# Patient Record
Sex: MALE | Race: WHITE | NOT HISPANIC OR LATINO | Employment: OTHER | URBAN - METROPOLITAN AREA
[De-identification: names, ages, dates, MRNs, and addresses within clinical notes are randomized per-mention and may not be internally consistent; named-entity substitution may affect disease eponyms.]

---

## 2017-11-08 ENCOUNTER — APPOINTMENT (OUTPATIENT)
Dept: CT IMAGING | Facility: HOSPITAL | Age: 72
End: 2017-11-08

## 2017-11-08 ENCOUNTER — HOSPITAL ENCOUNTER (EMERGENCY)
Facility: HOSPITAL | Age: 72
Discharge: HOME | End: 2017-11-08
Attending: FAMILY MEDICINE | Admitting: FAMILY MEDICINE

## 2017-11-08 VITALS
HEART RATE: 80 BPM | SYSTOLIC BLOOD PRESSURE: 122 MMHG | WEIGHT: 207 LBS | TEMPERATURE: 98.3 F | HEIGHT: 70 IN | DIASTOLIC BLOOD PRESSURE: 80 MMHG | RESPIRATION RATE: 16 BRPM | BODY MASS INDEX: 29.63 KG/M2 | OXYGEN SATURATION: 97 %

## 2017-11-08 DIAGNOSIS — N39.0 ACUTE UTI: ICD-10-CM

## 2017-11-08 DIAGNOSIS — R56.9 SEIZURE (HCC): Primary | ICD-10-CM

## 2017-11-08 LAB
ALBUMIN SERPL-MCNC: 4.4 G/DL (ref 3.5–5.2)
ALBUMIN/GLOB SERPL: 1.1 G/DL
ALP SERPL-CCNC: 71 U/L (ref 39–117)
ALT SERPL W P-5'-P-CCNC: 8 U/L (ref 1–41)
ANION GAP SERPL CALCULATED.3IONS-SCNC: 15 MMOL/L
AST SERPL-CCNC: 13 U/L (ref 1–40)
BACTERIA UR QL AUTO: ABNORMAL /HPF
BASOPHILS # BLD AUTO: 0.02 10*3/MM3 (ref 0–0.2)
BASOPHILS NFR BLD AUTO: 0.2 % (ref 0–1.5)
BILIRUB SERPL-MCNC: 0.5 MG/DL (ref 0.1–1.2)
BILIRUB UR QL STRIP: NEGATIVE
BUN BLD-MCNC: 15 MG/DL (ref 8–23)
BUN/CREAT SERPL: 21.4 (ref 7–25)
CALCIUM SPEC-SCNC: 9.9 MG/DL (ref 8.6–10.5)
CHLORIDE SERPL-SCNC: 92 MMOL/L (ref 98–107)
CK SERPL-CCNC: 19 U/L (ref 20–200)
CLARITY UR: ABNORMAL
CO2 SERPL-SCNC: 25 MMOL/L (ref 22–29)
COLOR UR: YELLOW
CREAT BLD-MCNC: 0.7 MG/DL (ref 0.76–1.27)
DEPRECATED RDW RBC AUTO: 45.5 FL (ref 37–54)
EOSINOPHIL # BLD AUTO: 0.01 10*3/MM3 (ref 0–0.7)
EOSINOPHIL NFR BLD AUTO: 0.1 % (ref 0.3–6.2)
ERYTHROCYTE [DISTWIDTH] IN BLOOD BY AUTOMATED COUNT: 13.1 % (ref 11.5–14.5)
GFR SERPL CREATININE-BSD FRML MDRD: 110 ML/MIN/1.73
GLOBULIN UR ELPH-MCNC: 3.9 GM/DL
GLUCOSE BLD-MCNC: 104 MG/DL (ref 65–99)
GLUCOSE UR STRIP-MCNC: NEGATIVE MG/DL
HCT VFR BLD AUTO: 39.6 % (ref 40.4–52.2)
HGB BLD-MCNC: 14.2 G/DL (ref 13.7–17.6)
HGB UR QL STRIP.AUTO: NEGATIVE
HYALINE CASTS UR QL AUTO: ABNORMAL /LPF
IMM GRANULOCYTES # BLD: 0.06 10*3/MM3 (ref 0–0.03)
IMM GRANULOCYTES NFR BLD: 0.5 % (ref 0–0.5)
INR PPP: 1.07 (ref 0.9–1.1)
KETONES UR QL STRIP: NEGATIVE
LEUKOCYTE ESTERASE UR QL STRIP.AUTO: ABNORMAL
LYMPHOCYTES # BLD AUTO: 0.79 10*3/MM3 (ref 0.9–4.8)
LYMPHOCYTES NFR BLD AUTO: 6.6 % (ref 19.6–45.3)
MCH RBC QN AUTO: 34.1 PG (ref 27–32.7)
MCHC RBC AUTO-ENTMCNC: 35.9 G/DL (ref 32.6–36.4)
MCV RBC AUTO: 95.2 FL (ref 79.8–96.2)
MONOCYTES # BLD AUTO: 0.79 10*3/MM3 (ref 0.2–1.2)
MONOCYTES NFR BLD AUTO: 6.6 % (ref 5–12)
NEUTROPHILS # BLD AUTO: 10.35 10*3/MM3 (ref 1.9–8.1)
NEUTROPHILS NFR BLD AUTO: 86 % (ref 42.7–76)
NITRITE UR QL STRIP: POSITIVE
PH UR STRIP.AUTO: 5.5 [PH] (ref 5–8)
PLATELET # BLD AUTO: 257 10*3/MM3 (ref 140–500)
PMV BLD AUTO: 10.3 FL (ref 6–12)
POTASSIUM BLD-SCNC: 4.2 MMOL/L (ref 3.5–5.2)
PROT SERPL-MCNC: 8.3 G/DL (ref 6–8.5)
PROT UR QL STRIP: NEGATIVE
PROTHROMBIN TIME: 13.5 SECONDS (ref 11.7–14.2)
RBC # BLD AUTO: 4.16 10*6/MM3 (ref 4.6–6)
RBC # UR: ABNORMAL /HPF
REF LAB TEST METHOD: ABNORMAL
SODIUM BLD-SCNC: 132 MMOL/L (ref 136–145)
SP GR UR STRIP: 1.01 (ref 1–1.03)
SQUAMOUS #/AREA URNS HPF: ABNORMAL /HPF
TROPONIN T SERPL-MCNC: <0.01 NG/ML (ref 0–0.03)
UROBILINOGEN UR QL STRIP: ABNORMAL
WBC NRBC COR # BLD: 12.02 10*3/MM3 (ref 4.5–10.7)
WBC UR QL AUTO: ABNORMAL /HPF

## 2017-11-08 PROCEDURE — 87186 SC STD MICRODIL/AGAR DIL: CPT | Performed by: FAMILY MEDICINE

## 2017-11-08 PROCEDURE — 87086 URINE CULTURE/COLONY COUNT: CPT | Performed by: FAMILY MEDICINE

## 2017-11-08 PROCEDURE — 81001 URINALYSIS AUTO W/SCOPE: CPT | Performed by: FAMILY MEDICINE

## 2017-11-08 PROCEDURE — 96367 TX/PROPH/DG ADDL SEQ IV INF: CPT

## 2017-11-08 PROCEDURE — 85610 PROTHROMBIN TIME: CPT | Performed by: FAMILY MEDICINE

## 2017-11-08 PROCEDURE — 25010000002 LEVOFLOXACIN PER 250 MG: Performed by: FAMILY MEDICINE

## 2017-11-08 PROCEDURE — 85025 COMPLETE CBC W/AUTO DIFF WBC: CPT | Performed by: FAMILY MEDICINE

## 2017-11-08 PROCEDURE — 25010000002 LEVETRIRACETAM PER 10 MG: Performed by: FAMILY MEDICINE

## 2017-11-08 PROCEDURE — 84484 ASSAY OF TROPONIN QUANT: CPT | Performed by: FAMILY MEDICINE

## 2017-11-08 PROCEDURE — 99284 EMERGENCY DEPT VISIT MOD MDM: CPT

## 2017-11-08 PROCEDURE — 80053 COMPREHEN METABOLIC PANEL: CPT | Performed by: FAMILY MEDICINE

## 2017-11-08 PROCEDURE — 93005 ELECTROCARDIOGRAM TRACING: CPT | Performed by: FAMILY MEDICINE

## 2017-11-08 PROCEDURE — 82550 ASSAY OF CK (CPK): CPT | Performed by: FAMILY MEDICINE

## 2017-11-08 PROCEDURE — 96365 THER/PROPH/DIAG IV INF INIT: CPT

## 2017-11-08 PROCEDURE — 70450 CT HEAD/BRAIN W/O DYE: CPT

## 2017-11-08 RX ORDER — CLOPIDOGREL BISULFATE 75 MG/1
75 TABLET ORAL DAILY
COMMUNITY

## 2017-11-08 RX ORDER — LEVETIRACETAM 500 MG/1
250 TABLET ORAL 2 TIMES DAILY
Qty: 60 TABLET | Refills: 0 | Status: SHIPPED | OUTPATIENT
Start: 2017-11-08

## 2017-11-08 RX ORDER — FEXOFENADINE HYDROCHLORIDE 60 MG/1
60 TABLET, FILM COATED ORAL DAILY
COMMUNITY

## 2017-11-08 RX ORDER — LEVOFLOXACIN 5 MG/ML
500 INJECTION, SOLUTION INTRAVENOUS ONCE
Status: COMPLETED | OUTPATIENT
Start: 2017-11-08 | End: 2017-11-08

## 2017-11-08 RX ORDER — SULFAMETHOXAZOLE AND TRIMETHOPRIM 800; 160 MG/1; MG/1
1 TABLET ORAL 2 TIMES DAILY
Qty: 14 TABLET | Refills: 0 | Status: SHIPPED | OUTPATIENT
Start: 2017-11-08 | End: 2017-11-08 | Stop reason: HOSPADM

## 2017-11-08 RX ORDER — SULFAMETHOXAZOLE AND TRIMETHOPRIM 800; 160 MG/1; MG/1
1 TABLET ORAL 2 TIMES DAILY
Qty: 14 TABLET | Refills: 0 | Status: SHIPPED | OUTPATIENT
Start: 2017-11-08

## 2017-11-08 RX ORDER — ATORVASTATIN CALCIUM 40 MG/1
40 TABLET, FILM COATED ORAL DAILY
COMMUNITY

## 2017-11-08 RX ADMIN — LEVETIRACETAM 250 MG: 100 INJECTION, SOLUTION, CONCENTRATE INTRAVENOUS at 18:14

## 2017-11-08 RX ADMIN — LEVOFLOXACIN 500 MG: 5 INJECTION, SOLUTION INTRAVENOUS at 18:55

## 2017-11-08 NOTE — ED PROVIDER NOTES
" EMERGENCY DEPARTMENT ENCOUNTER    CHIEF COMPLAINT  Chief Complaint: syncope  History given by: patient  History limited by: dementia  Room Number: 12/12  PMD: Hugo Galeas MD      HPI:  Pt is a 72 y.o. male who presents to the ED via EMS. Pt said he walking outside but then became hot and vomited. When he walked back inside the house, he remembers falling against the wall and sliding down on his butt. He does not remember the fall. Pt has a hx of dementia, TIA, HTN, UTI, and hyperlipidemia.     Duration:  unknown  Onset: sudden  Timing: unknown  Location: generalized  Radiation: none  Quality: n/a  Intensity/Severity: severe  Progression: resolved  Associated Symptoms: \"hot\" and vomiting  Aggravating Factors: none  Alleviating Factors: none  Previous Episodes: none  Treatment before arrival: EMS care and intervention.     PAST MEDICAL HISTORY  Active Ambulatory Problems     Diagnosis Date Noted   • No Active Ambulatory Problems     Resolved Ambulatory Problems     Diagnosis Date Noted   • No Resolved Ambulatory Problems     Past Medical History:   Diagnosis Date   • Dementia    • Hyperlipidemia    • Hypertension    • TIA (transient ischemic attack)    • UTI (urinary tract infection)        PAST SURGICAL HISTORY  Past Surgical History:   Procedure Laterality Date   • CAROTID ARTERY ANGIOPLASTY     • REPLACEMENT TOTAL KNEE BILATERAL     • VASECTOMY         FAMILY HISTORY  History reviewed. No pertinent family history.    SOCIAL HISTORY  Social History     Social History   • Marital status:      Spouse name: N/A   • Number of children: N/A   • Years of education: N/A     Occupational History   • Not on file.     Social History Main Topics   • Smoking status: Former Smoker     Types: Cigarettes     Quit date: 1997   • Smokeless tobacco: Not on file   • Alcohol use Yes      Comment: 3-4 drinks of rum/night   • Drug use: No   • Sexual activity: Not on file     Other Topics Concern   • Not on file     Social " History Narrative   • No narrative on file       ALLERGIES  Penicillins    REVIEW OF SYSTEMS  Review of Systems   Unable to perform ROS: Dementia       PHYSICAL EXAM  ED Triage Vitals   Temp Heart Rate Resp BP SpO2   11/08/17 1530 11/08/17 1530 11/08/17 1530 11/08/17 1530 11/08/17 1530   98.3 °F (36.8 °C) 78 20 128/80 96 %      Temp src Heart Rate Source Patient Position BP Location FiO2 (%)   -- 11/08/17 1545 11/08/17 1530 11/08/17 1545 --    Monitor Sitting Right arm        Physical Exam   Constitutional: He is oriented to person, place, and time and well-developed, well-nourished, and in no distress.   HENT:   Head: Normocephalic and atraumatic.   Eyes: EOM are normal. Pupils are equal, round, and reactive to light.   Neck: Normal range of motion. Neck supple.   Cardiovascular: Normal rate, regular rhythm and normal heart sounds.    Pulmonary/Chest: Effort normal and breath sounds normal. No respiratory distress.   Abdominal: Soft. There is no tenderness. There is no rebound and no guarding.   Musculoskeletal: Normal range of motion. He exhibits no edema.   Neurological: He is alert and oriented to person, place, and time. He has normal sensation and normal strength.   Skin: Skin is warm and dry.   Psychiatric: Mood and affect normal.   Nursing note and vitals reviewed.      LAB RESULTS  Lab Results (last 24 hours)     Procedure Component Value Units Date/Time    CBC & Differential [[id number]] Collected:  11/08/17 1623    Specimen:  Blood Updated:  11/08/17 1703    Narrative:       The following orders were created for panel order CBC & Differential.  Procedure                               Abnormality         Status                     ---------                               -----------         ------                     CBC Auto Differential[[id number]]        Abnormal            Final result                 Please view results for these tests on the individual orders.    Comprehensive Metabolic Panel  [[id number]]  (Abnormal) Collected:  11/08/17 1623    Specimen:  Blood Updated:  11/08/17 1726     Glucose 104 (H) mg/dL      BUN 15 mg/dL      Creatinine 0.70 (L) mg/dL      Sodium 132 (L) mmol/L      Potassium 4.2 mmol/L      Chloride 92 (L) mmol/L      CO2 25.0 mmol/L      Calcium 9.9 mg/dL      Total Protein 8.3 g/dL      Albumin 4.40 g/dL      ALT (SGPT) 8 U/L      AST (SGOT) 13 U/L      Alkaline Phosphatase 71 U/L      Total Bilirubin 0.5 mg/dL      eGFR Non African Amer 110 mL/min/1.73      Globulin 3.9 gm/dL      A/G Ratio 1.1 g/dL      BUN/Creatinine Ratio 21.4     Anion Gap 15.0 mmol/L     Narrative:       The MDRD GFR formula is only valid for adults with stable renal function between ages 18 and 70.    CK [[id number]]  (Abnormal) Collected:  11/08/17 1623    Specimen:  Blood Updated:  11/08/17 1726     Creatine Kinase 19 (L) U/L     Troponin [[id number]]  (Normal) Collected:  11/08/17 1623    Specimen:  Blood Updated:  11/08/17 1726     Troponin T <0.010 ng/mL     Narrative:       Troponin T Reference Ranges:  Less than 0.03 ng/mL:    Negative for AMI  0.03 to 0.09 ng/mL:      Indeterminant for AMI  Greater than 0.09 ng/mL: Positive for AMI    Protime-INR [[id number]]  (Normal) Collected:  11/08/17 1623    Specimen:  Blood Updated:  11/08/17 1715     Protime 13.5 Seconds      INR 1.07    CBC Auto Differential [[id number]]  (Abnormal) Collected:  11/08/17 1623    Specimen:  Blood Updated:  11/08/17 1703     WBC 12.02 (H) 10*3/mm3      RBC 4.16 (L) 10*6/mm3      Hemoglobin 14.2 g/dL      Hematocrit 39.6 (L) %      MCV 95.2 fL      MCH 34.1 (H) pg      MCHC 35.9 g/dL      RDW 13.1 %      RDW-SD 45.5 fl      MPV 10.3 fL      Platelets 257 10*3/mm3      Neutrophil % 86.0 (H) %      Lymphocyte % 6.6 (L) %      Monocyte % 6.6 %      Eosinophil % 0.1 (L) %      Basophil % 0.2 %      Immature Grans % 0.5 %      Neutrophils, Absolute 10.35 (H) 10*3/mm3      Lymphocytes, Absolute 0.79 (L) 10*3/mm3      Monocytes,  Absolute 0.79 10*3/mm3      Eosinophils, Absolute 0.01 10*3/mm3      Basophils, Absolute 0.02 10*3/mm3      Immature Grans, Absolute 0.06 (H) 10*3/mm3     Urinalysis With / Culture If Indicated - Urine, Clean Catch [[id number]]  (Abnormal) Collected:  11/08/17 1740    Specimen:  Urine from Urine, Catheter Updated:  11/08/17 1752     Color, UA Yellow     Appearance, UA Cloudy (A)     pH, UA 5.5     Specific Gravity, UA 1.012     Glucose, UA Negative     Ketones, UA Negative     Bilirubin, UA Negative     Blood, UA Negative     Protein, UA Negative     Leuk Esterase, UA Large (3+) (A)     Nitrite, UA Positive (A)     Urobilinogen, UA 0.2 E.U./dL    Urinalysis, Microscopic Only - Urine, Clean Catch [[id number]]  (Abnormal) Collected:  11/08/17 1740    Specimen:  Urine from Urine, Catheter Updated:  11/08/17 1804     RBC, UA 0-2 /HPF      WBC, UA Too Numerous to Count (A) /HPF      Bacteria, UA 4+ (A) /HPF      Squamous Epithelial Cells, UA 0-2 /HPF      Hyaline Casts, UA 0-2 /LPF      Methodology Automated Microscopy    Urine Culture - Urine, Urine, Clean Catch [[id number]] Collected:  11/08/17 1740    Specimen:  Urine from Urine, Catheter Updated:  11/08/17 1751          I ordered the above labs and reviewed the results    RADIOLOGY  CT Head Without Contrast   Final Result           No acute intracranial hemorrhage or hydrocephalus. Likely chronic   ischemic changes of the right frontoparietal cortex, suggest clinical   correlation, and comparison with prior imaging if it can be obtained;   enhanced imaging follow-up can be obtained to exclude any possibility of   a lesion.       Discussed by telephone Dr. Cummins at time of interpretation, 1700,   11/08/2017.       This report was finalized on 11/8/2017 5:01 PM by Dr. Brian Manzanares MD.               I ordered the above noted radiological studies. Interpreted by radiologist. Reviewed by me in PACS.       PROCEDURES  Procedures  EKG           EKG time:  1612  Rhythm/Rate: NSR 77  P waves and VT: nml  QRS, axis: nml   ST and T waves: nml     Interpreted Contemporaneously by me, independently viewed  No prior for comparison.       PROGRESS AND CONSULTS  ED Course   1601  EKG, CBC, CK, UA, CMP, protime-INR, Troponin, CT head ordered.    1712  BP- 152/85 HR- 75 Temp- 98.3 °F (36.8 °C) O2 sat- 98%  Rechecked the patient who is in NAD and is resting comfortably. Daughter-in-law is now bedside. She reports that he had a similar episode a year ago and was taken to the ED. Pt had another similar episode labor day weekend where the pt fell and had incontinence followed with another episode two weeks after that. She doubts that he has full LOC but can have nonresponiveness lasting approximately 15 minutes. Pt's wife reported to her that today he was actually shaking and vomited prior to falling and was again incontinent of urine. Pt has moved from [place] in September and sees Dr. Galeas now and Dr. Caldwell for neurology. Pt is back to baseline per pt's family.     1722  Call placed to PCP and neurology    1732  Dr. Galeas consulted and reviewed the records. He had not seen an MRI yet on the pt.     1737  Dr. Caldwell consulted and told to prescribe sz medications (low dose keppra) and to continue with the scheduled EEG and follow up.     1740  Discussed the plan to give first dose of sz medication and give rx upon d/c. Pt's family told that follow up MRI would need to be done. Pt's family understands and agrees with the plan, all questions answered.    1828  Beside rectal exam showed normal prostate. Pt was also cathed and 500mLs removed. Pt says he says he caths himself occasionally and has been doing that for the past few years but has not recently.  He is not sure why but apparently has some retention.  Pt will be put on an abx and referred to an urologist.     MEDICAL DECISION MAKING  Results were reviewed/discussed with the patient and they were also made aware of online access. Pt  also made aware that some labs, such as cultures, will not be resulted during ER visit and follow up with PMD is necessary.     MDM  Number of Diagnoses or Management Options     Amount and/or Complexity of Data Reviewed  Clinical lab tests: (CK 19, Troponin is <0.010)  Tests in the radiology section of CPT®: reviewed (Two right cortical areas that are probably old infarcts but need to be followed up with enhanced study.)  Tests in the medicine section of CPT®: reviewed (See EKG procedure note.)  Discussion of test results with the performing providers: yes (Dr. Manzanares, radiology)  Discuss the patient with other providers: yes (Dr. Galeas, PCP  Dr. Caldwell, neurology)           DIAGNOSIS  Final diagnoses:   Seizure   Acute UTI       DISPOSITION  DISCHARGE    Patient discharged in stable condition.    Reviewed implications of results, diagnosis, meds, responsibility to follow up, warning signs and symptoms of possible worsening, potential complications and reasons to return to ER.    Patient/Family voiced understanding of above instructions.    Discussed plan for discharge, as there is no emergent indication for admission.  Pt/family is agreeable and understands need for follow up and repeat testing.  Pt is aware that discharge does not mean that nothing is wrong but it indicates no emergency is present that requires admission and they must continue care with follow-up as given below or physician of their choice.     FOLLOW-UP  Hugo Galeas MD  [address] [facility] [address] [phone]      We will want you rechecked by Dr Galeas or Dr Caldwell within the next week.  If he hasn't had a recent MRI that will need to be arranged.      Milo Caldwell DO  [address] [facility] [address]  [facility] [id number] [phone]      [facility] and we will want you rechecked within a week by Dr Caldwell within a week.      Jesus Boyce MD  [address] [facility]  [address]  [phone]      Call Dr Boyce's office for a recheck early next week.         Medication List      New Prescriptions          levETIRAcetam 500 MG tablet   Commonly known as:  KEPPRA   Take 0.5 tablets by mouth 2 (Two) Times a Day.       sulfamethoxazole-trimethoprim 800-160 MG per tablet   Commonly known as:  BACTRIM DS,SEPTRA DS   Take 1 tablet by mouth 2 (Two) Times a Day.               Latest Documented Vital Signs:  As of 11:23 PM  BP- 122/80 HR- 80 Temp- 98.3 °F (36.8 °C) O2 sat- 97%    --  Documentation assistance provided by rebekah Rivera for Dr. Cummins.  Information recorded by the scribe was done at my direction and has been verified and validated by me.       Diana Rivera  11/08/17 0559       Perry Cummins MD  11/08/17 8627

## 2017-11-08 NOTE — ED NOTES
Pts daughter reports pt was walking to the bathroom when he became gray in color, and began to fall towards his right side. Pt was breathing spontaneously but did not respond to voice, with blank look on his face. Pt did urinate spontaneously following episode. Pts daughter reports he had two episodes similar to this staring in September.      Rubia Manuel RN  11/08/17 [id number]

## 2017-11-11 LAB
BACTERIA SPEC AEROBE CULT: ABNORMAL
BACTERIA SPEC AEROBE CULT: ABNORMAL

## 2018-10-04 ENCOUNTER — OFFICE VISIT (OUTPATIENT)
Dept: SURGERY | Facility: CLINIC | Age: 73
End: 2018-10-04

## 2018-10-04 VITALS — WEIGHT: 209.6 LBS | HEIGHT: 70 IN | HEART RATE: 80 BPM | BODY MASS INDEX: 30.01 KG/M2 | OXYGEN SATURATION: 98 %

## 2018-10-04 DIAGNOSIS — L02.212 CUTANEOUS ABSCESS OF BACK EXCLUDING BUTTOCKS: Primary | ICD-10-CM

## 2018-10-04 PROCEDURE — 87070 CULTURE OTHR SPECIMN AEROBIC: CPT | Performed by: SURGERY

## 2018-10-04 PROCEDURE — 87205 SMEAR GRAM STAIN: CPT | Performed by: SURGERY

## 2018-10-04 PROCEDURE — 10060 I&D ABSCESS SIMPLE/SINGLE: CPT | Performed by: SURGERY

## 2018-10-04 PROCEDURE — 99202 OFFICE O/P NEW SF 15 MIN: CPT | Performed by: SURGERY

## 2018-10-04 RX ORDER — LISINOPRIL 10 MG/1
TABLET ORAL
COMMUNITY
Start: 2018-02-13

## 2018-10-04 RX ORDER — SIMVASTATIN 40 MG
TABLET ORAL DAILY
Refills: 3 | COMMUNITY
Start: 2018-09-10

## 2018-10-04 NOTE — PROGRESS NOTES
Subjective   Leo Garces is a 73 y.o. male who presents to the office in surgical consultation from Hugo Galeas MD for an abscess of the left back.    History of Present Illness     The patient developed a lesion on his left back that has slowly gotten larger and increasingly symptomatic over the past week or so.  The area then started to drain but has not gone down in size and continues to be very tender.  There has been no trauma to that area.    Review of Systems   Constitutional: Negative for activity change, appetite change, fatigue and fever.   HENT: Negative for trouble swallowing and voice change.    Respiratory: Negative for chest tightness and shortness of breath.    Cardiovascular: Negative for chest pain and palpitations.   Gastrointestinal: Negative for abdominal pain, blood in stool, constipation, diarrhea, nausea and vomiting.   Endocrine: Negative for cold intolerance and heat intolerance.   Genitourinary: Negative for dysuria and flank pain.   Neurological: Negative for dizziness and light-headedness.   Hematological: Negative for adenopathy. Does not bruise/bleed easily.   Psychiatric/Behavioral: Negative for agitation and confusion.     Past Medical History:   Diagnosis Date   • Dementia    • Depression    • Hyperlipidemia    • Hypertension    • Seizures (CMS/HCC)    • Stroke (CMS/HCC)    • TIA (transient ischemic attack)    • UTI (urinary tract infection)      Past Surgical History:   Procedure Laterality Date   • CAROTID ARTERY ANGIOPLASTY  2017   • REPLACEMENT TOTAL KNEE BILATERAL     • VASECTOMY       History reviewed. No pertinent family history.  Social History     Social History   • Marital status:      Spouse name: N/A   • Number of children: N/A   • Years of education: N/A     Occupational History   • retired      Social History Main Topics   • Smoking status: Former Smoker     Types: Cigarettes     Quit date: 1997   • Smokeless tobacco: Never Used   • Alcohol use Yes       Comment: 3-4 drinks of rum/night   • Drug use: No   • Sexual activity: Defer     Other Topics Concern   • Not on file     Social History Narrative   • No narrative on file       Objective   Physical Exam   Constitutional: He is oriented to person, place, and time. He appears well-developed and well-nourished.  Non-toxic appearance.   Eyes: EOM are normal. No scleral icterus.   Pulmonary/Chest: Effort normal. No respiratory distress.   Abdominal: Normal appearance.   Neurological: He is alert and oriented to person, place, and time.   Skin: Skin is warm and dry.   There is a 6 cm abscess of the left upper back with overlying erythema and palpable fluctuance.   Psychiatric: He has a normal mood and affect. His behavior is normal. Judgment and thought content normal.     Procedure  The area of the left upper back abscess was prepped and draped in the standard surgical fashion.  It was infiltrated with 1% lidocaine without epinephrine.  An incision was made through the skin into the abscess cavity using a scalpel.  Purulent material was immediately encountered and the abscess was completely evacuated.  It was irrigated with the local anesthetic.  It was then packed with iodoform gauze.  A dressing was applied.  The patient tolerated procedure well.    Assessment/Plan       The encounter diagnosis was Cutaneous abscess of back excluding buttocks.    The patient has a subcutaneous abscess of the left upper back.  An incision and drainage of the abscess was performed.  Local wound care was discussed with him.  He will follow-up on an as-needed basis.

## 2018-10-07 LAB
BACTERIA SPEC AEROBE CULT: NORMAL
GRAM STN SPEC: NORMAL
GRAM STN SPEC: NORMAL